# Patient Record
(demographics unavailable — no encounter records)

---

## 2024-10-22 NOTE — PHYSICAL EXAM
[Alert] : alert [No Acute Distress] : no acute distress [Normocephalic] : normocephalic [EOMI Bilateral] : EOMI bilateral [PERRLA] : ARON [Clear tympanic membranes with bony landmarks and light reflex present bilaterally] : clear tympanic membranes with bony landmarks and light reflex present bilaterally  [Pink Nasal Mucosa] : pink nasal mucosa [Nonerythematous Oropharynx] : nonerythematous oropharynx [Supple, full passive range of motion] : supple, full passive range of motion [No Palpable Masses] : no palpable masses [Clear to Auscultation Bilaterally] : clear to auscultation bilaterally [Regular Rate and Rhythm] : regular rate and rhythm [Normal S1, S2 audible] : normal S1, S2 audible [No Murmurs] : no murmurs [Soft] : soft [NonTender] : non tender [Non Distended] : non distended [Normoactive Bowel Sounds] : normoactive bowel sounds [Ricardo: _____] : Ricardo [unfilled] [Uncircumcised] : uncircumcised [Bilateral descended testes] : bilateral descended testes [No Testicular Masses] : no testicular masses [Normal Muscle Tone] : normal muscle tone [No pain or deformities with palpation of bone, muscles, joints] : no pain or deformities with palpation of bone, muscles, joints [Moves all extremities x 4] : moves all extremities x4 [Cranial Nerves Grossly Intact] : cranial nerves grossly intact [FreeTextEntry1] : pleasant [FreeTextEntry4] : nasal congestion [FreeTextEntry6] : no inguinal hernias  [de-identified] : mild spinal asymmetry of thoracic spine [de-identified] : normal strength in all extremities  [de-identified] : irregular mild hypopigmentation on L forearm

## 2024-10-22 NOTE — PHYSICAL EXAM
[Alert] : alert [No Acute Distress] : no acute distress [Normocephalic] : normocephalic [EOMI Bilateral] : EOMI bilateral [PERRLA] : ARON [Clear tympanic membranes with bony landmarks and light reflex present bilaterally] : clear tympanic membranes with bony landmarks and light reflex present bilaterally  [Pink Nasal Mucosa] : pink nasal mucosa [Nonerythematous Oropharynx] : nonerythematous oropharynx [Supple, full passive range of motion] : supple, full passive range of motion [No Palpable Masses] : no palpable masses [Clear to Auscultation Bilaterally] : clear to auscultation bilaterally [Regular Rate and Rhythm] : regular rate and rhythm [Normal S1, S2 audible] : normal S1, S2 audible [No Murmurs] : no murmurs [Soft] : soft [NonTender] : non tender [Non Distended] : non distended [Normoactive Bowel Sounds] : normoactive bowel sounds [Ricardo: _____] : Ricardo [unfilled] [Uncircumcised] : uncircumcised [Bilateral descended testes] : bilateral descended testes [No Testicular Masses] : no testicular masses [Normal Muscle Tone] : normal muscle tone [No pain or deformities with palpation of bone, muscles, joints] : no pain or deformities with palpation of bone, muscles, joints [Moves all extremities x 4] : moves all extremities x4 [Cranial Nerves Grossly Intact] : cranial nerves grossly intact [FreeTextEntry1] : pleasant [FreeTextEntry4] : nasal congestion [FreeTextEntry6] : no inguinal hernias  [de-identified] : mild spinal asymmetry of thoracic spine [de-identified] : normal strength in all extremities  [de-identified] : irregular mild hypopigmentation on L forearm

## 2024-10-22 NOTE — HISTORY OF PRESENT ILLNESS
[Mother] : mother [Yes] : Patient goes to dentist yearly [Toothpaste] : Primary Fluoride Source: Toothpaste [Up to date] : Up to date [Needs Immunizations] : Needs immunizations [FreeTextEntry7] : UC visit last week for L hand ring finger injury while playing football, no concern for fracture; swelling has improved, pain has resolved, no meds taken this week [de-identified] : MenACWY #2 & Flu  [FreeTextEntry1] :  Diet consists of meat and carbs, minimal fruits and vegetables, no dairy No sugary drinks  Reports lip/tongue itching whenever he eats apples, no other sx of allergic reaction  Normal elimination  Adequate sleep (at least 8 hours), denies problems  In 11th grade Grades are above average (with exception of math, which he barely passed last year) Receives extra help and private tutoring for math   Plays two-hand touch football and basketball with friends 5 days/week Rides bike (without helmet) regularly Involved in afterschool clubs  Lives with parents and two siblings (ages 13 and 21) No smoke exposure Has chet 's license, allowed to drive until 9 pm  Ortho F/U appt in Nov 2023: x-ray noted < 10 degree curvature, dx with spinal asymmetry, recommend observation, no F/U needed  Allergy initial appt in March 2024: SPT+ mold, trees, weeds, grasses, recommended allergy meds and flonase, dx with oral allergy syndrome (no food allergy testing done)  Of note, previously evaluated by Cardio in 2020 for abnormal EKG (during ER for chest pain after direct injury to chest while jumping on trampoline, normal CXR but abnormal EKG) EKG NSR; Echo normal size and function, PFO with left to right shunt (normal variant) No Cardio F/U needed  FH: maternal grandmother sudden death at 34 years of age while dancing, father high cholesterol   Confidential hx: Feels safe at home and at school Denies bullying or peer pressure Denies alcohol/tobacco/substance use Denies sexual activity or sexual abuse Denies depression, anxiety, SI or HI PHQ-9 score 0, WILLIAM-7 score 0, CRAFFT screen negative

## 2024-10-22 NOTE — RISK ASSESSMENT
[Little interest or pleasure doing things] : 1) Little interest or pleasure doing things [Feeling down, depressed, or hopeless] : 2) Feeling down, depressed, or hopeless [0] : 2) Feeling down, depressed, or hopeless: Not at all (0) [PHQ-2 Negative - No further assessment needed] : PHQ-2 Negative - No further assessment needed [Has anyone in your immediate family (parents, grandparents, siblings) or other more distant relatives (aunts, uncles, cousins)  of heart] : Has anyone in your immediate family (parents, grandparents, siblings) or other more distant relatives (aunts, uncles, cousins)  of heart problems or had an unexpected sudden death before age 50 (This would include unexpected drownings, unexplained car accidents in which the relative was driving or sudden infant death syndrome.)? Yes [No Increased risk of SCA or SCD] : No Increased risk of SCA or SCD    [[id number]] : 0 [Have you ever fainted, passed out or had an unexplained seizure suddenly and without warning, especially during exercise or in response] : Have you ever fainted, passed out or had an unexplained seizure suddenly and without warning, especially during exercise or in response to sudden loud noises such as doorbells, alarm clocks and ringing telephones? No [Have you ever had exercise-related chest pain or shortness of breath?] : Have you ever had exercise-related chest pain or shortness of breath? No [Are you related to anyone with hypertrophic cardiomyopathy or hypertrophic obstructive cardiomyopathy, Marfan syndrome, arrhythmogenic] : Are you related to anyone with hypertrophic cardiomyopathy or hypertrophic obstructive cardiomyopathy, Marfan syndrome, arrhythmogenic right ventricular cardiomyopathy, long QT syndrome, short QT syndrome, Brugada syndrome or catecholaminergic polymorphic ventricular tachycardia, or anyone younger than 50 years with a pacemaker or implantable defibrillator? No

## 2024-10-22 NOTE — DISCUSSION/SUMMARY
[Normal Growth] : growth [Normal Development] : development  [No Elimination Concerns] : elimination [Normal Sleep Pattern] : sleep [Anticipatory Guidance Given] : Anticipatory guidance addressed as per the history of present illness section [Influenza] : influenza [MCV] : meningococcal conjugate vaccine [No Medication Changes] : no medication changes [Patient] : patient [Mother] : mother [Full Activity without restrictions including Physical Education & Athletics] : Full Activity without restrictions including Physical Education & Athletics [I have examined the above-named student and completed the preparticipation physical evaluation. The athlete does not present apparent clinical contraindications to practice and participate in sport(s) as outlined above. A copy of the physical exam is on r] : I have examined the above-named student and completed the preparticipation physical evaluation. The athlete does not present apparent clinical contraindications to practice and participate in sport(s) as outlined above. A copy of the physical exam is on record in my office and can be made available to the school at the request of the parents. If conditions arise after the athlete has been cleared for participation, the physician may rescind the clearance until the problem is resolved and the potential consequences are completely explained to the athlete (and parents/guardians). [] : The components of the vaccine(s) to be administered today are listed in the plan of care. The disease(s) for which the vaccine(s) are intended to prevent and the risks have been discussed with the caretaker.  The risks are also included in the appropriate vaccination information statements which have been provided to the patient's caregiver.  The caregiver has given consent to vaccinate. [FreeTextEntry1] :  Healthy 16 year old male with allergic rhinitis (recent SPT + mold, trees, weeds, grasses) and oral allergy syndrome (oral itching with apple consumption) Cardio eval 4 years ago for abnormal EKG during ER visit for musculoskeletal chest pain... EKG in Cardio office with voltage criteria for LVH but normal echo, no F/U needed; of note, FH of premature sudden death (maternal grandmother) No cardiac sx during exercise or otherwise Hx of elevated LDL cholesterol likely due to diet and FH; nearly normal testing last year Growth spurt in the last year (corresponding with pubertal stage); BMI in healthy range Exam notable for mild spinal asymmetry (evaluated by Ortho, no F/U needed) and irregular mild hypopigmentation on L forearm only (possibly due to dry skin, no concern for vitiligo) No concerns on HEADSS assessment  Repeat BP w/ large adult cuff 123/79   1) Health maintenance - Extensive dietary counseling provided - Recommend monitoring BP - Routine F/U with dentist - Received MenACWY #2 & Flu vaccines - Routine CBC and lipid testing  2) Allergies - Take OTC claritin, flonase, and zaditor PRN - No concern for food allergy

## 2024-10-22 NOTE — REVIEW OF SYSTEMS
[Snoring] : snoring [Negative] : Genitourinary [Nasal Congestion] : nasal congestion [Difficulty with Sleep] : no difficulty with sleep [Chest Pain] : no chest pain [Intolerance to Exercise] : tolerance to exercise [Back Pain] : no back pain [Restriction of Motion] : no restriction of motion [Bone Deformity] : no bone deformity [Swelling of Joint] : no swelling of joint [Erythema of Joint] : no erythema of joint [Rash] : no rash

## 2024-10-22 NOTE — RISK ASSESSMENT
[Little interest or pleasure doing things] : 1) Little interest or pleasure doing things [Feeling down, depressed, or hopeless] : 2) Feeling down, depressed, or hopeless [0] : 2) Feeling down, depressed, or hopeless: Not at all (0) [PHQ-2 Negative - No further assessment needed] : PHQ-2 Negative - No further assessment needed [Has anyone in your immediate family (parents, grandparents, siblings) or other more distant relatives (aunts, uncles, cousins)  of heart] : Has anyone in your immediate family (parents, grandparents, siblings) or other more distant relatives (aunts, uncles, cousins)  of heart problems or had an unexpected sudden death before age 50 (This would include unexpected drownings, unexplained car accidents in which the relative was driving or sudden infant death syndrome.)? Yes [No Increased risk of SCA or SCD] : No Increased risk of SCA or SCD    [[id number]] : 0 [Have you ever fainted, passed out or had an unexplained seizure suddenly and without warning, especially during exercise or in response] : Have you ever fainted, passed out or had an unexplained seizure suddenly and without warning, especially during exercise or in response to sudden loud noises such as doorbells, alarm clocks and ringing telephones? No [Have you ever had exercise-related chest pain or shortness of breath?] : Have you ever had exercise-related chest pain or shortness of breath? No [Are you related to anyone with hypertrophic cardiomyopathy or hypertrophic obstructive cardiomyopathy, Marfan syndrome, arrhythmogenic] : Are you related to anyone with hypertrophic cardiomyopathy or hypertrophic obstructive cardiomyopathy, Marfan syndrome, arrhythmogenic right ventricular cardiomyopathy, long QT syndrome, short QT syndrome, Brugada syndrome or catecholaminergic polymorphic ventricular tachycardia, or anyone younger than 50 years with a pacemaker or implantable defibrillator? No

## 2024-10-22 NOTE — RISK ASSESSMENT
[Little interest or pleasure doing things] : 1) Little interest or pleasure doing things [Feeling down, depressed, or hopeless] : 2) Feeling down, depressed, or hopeless [0] : 2) Feeling down, depressed, or hopeless: Not at all (0) [PHQ-2 Negative - No further assessment needed] : PHQ-2 Negative - No further assessment needed [Has anyone in your immediate family (parents, grandparents, siblings) or other more distant relatives (aunts, uncles, cousins)  of heart] : Has anyone in your immediate family (parents, grandparents, siblings) or other more distant relatives (aunts, uncles, cousins)  of heart problems or had an unexpected sudden death before age 50 (This would include unexpected drownings, unexplained car accidents in which the relative was driving or sudden infant death syndrome.)? Yes [No Increased risk of SCA or SCD] : No Increased risk of SCA or SCD    [[id number]] : 0 [Have you ever fainted, passed out or had an unexplained seizure suddenly and without warning, especially during exercise or in response] : Have you ever fainted, passed out or had an unexplained seizure suddenly and without warning, especially during exercise or in response to sudden loud noises such as doorbells, alarm clocks and ringing telephones? No [Have you ever had exercise-related chest pain or shortness of breath?] : Have you ever had exercise-related chest pain or shortness of breath? No [Are you related to anyone with hypertrophic cardiomyopathy or hypertrophic obstructive cardiomyopathy, Marfan syndrome, arrhythmogenic] : Are you related to anyone with hypertrophic cardiomyopathy or hypertrophic obstructive cardiomyopathy, Marfan syndrome, arrhythmogenic right ventricular cardiomyopathy, long QT syndrome, short QT syndrome, Brugada syndrome or catecholaminergic polymorphic ventricular tachycardia, or anyone younger than 50 years with a pacemaker or implantable defibrillator? No

## 2024-10-22 NOTE — PHYSICAL EXAM
[Alert] : alert [No Acute Distress] : no acute distress [Normocephalic] : normocephalic [EOMI Bilateral] : EOMI bilateral [PERRLA] : ARON [Clear tympanic membranes with bony landmarks and light reflex present bilaterally] : clear tympanic membranes with bony landmarks and light reflex present bilaterally  [Pink Nasal Mucosa] : pink nasal mucosa [Nonerythematous Oropharynx] : nonerythematous oropharynx [Supple, full passive range of motion] : supple, full passive range of motion [No Palpable Masses] : no palpable masses [Clear to Auscultation Bilaterally] : clear to auscultation bilaterally [Regular Rate and Rhythm] : regular rate and rhythm [Normal S1, S2 audible] : normal S1, S2 audible [No Murmurs] : no murmurs [Soft] : soft [NonTender] : non tender [Non Distended] : non distended [Normoactive Bowel Sounds] : normoactive bowel sounds [Ricardo: _____] : Ricardo [unfilled] [Uncircumcised] : uncircumcised [Bilateral descended testes] : bilateral descended testes [No Testicular Masses] : no testicular masses [Normal Muscle Tone] : normal muscle tone [No pain or deformities with palpation of bone, muscles, joints] : no pain or deformities with palpation of bone, muscles, joints [Moves all extremities x 4] : moves all extremities x4 [Cranial Nerves Grossly Intact] : cranial nerves grossly intact [FreeTextEntry1] : pleasant [FreeTextEntry4] : nasal congestion [FreeTextEntry6] : no inguinal hernias  [de-identified] : mild spinal asymmetry of thoracic spine [de-identified] : normal strength in all extremities  [de-identified] : irregular mild hypopigmentation on L forearm

## 2024-10-22 NOTE — HISTORY OF PRESENT ILLNESS
[Mother] : mother [Yes] : Patient goes to dentist yearly [Toothpaste] : Primary Fluoride Source: Toothpaste [Up to date] : Up to date [Needs Immunizations] : Needs immunizations [FreeTextEntry7] : UC visit last week for L hand ring finger injury while playing football, no concern for fracture; swelling has improved, pain has resolved, no meds taken this week [de-identified] : MenACWY #2 & Flu  [FreeTextEntry1] :  Diet consists of meat and carbs, minimal fruits and vegetables, no dairy No sugary drinks  Reports lip/tongue itching whenever he eats apples, no other sx of allergic reaction  Normal elimination  Adequate sleep (at least 8 hours), denies problems  In 11th grade Grades are above average (with exception of math, which he barely passed last year) Receives extra help and private tutoring for math   Plays two-hand touch football and basketball with friends 5 days/week Rides bike (without helmet) regularly Involved in afterschool clubs  Lives with parents and two siblings (ages 13 and 21) No smoke exposure Has chet 's license, allowed to drive until 9 pm  Ortho F/U appt in Nov 2023: x-ray noted < 10 degree curvature, dx with spinal asymmetry, recommend observation, no F/U needed  Allergy initial appt in March 2024: SPT+ mold, trees, weeds, grasses, recommended allergy meds and flonase, dx with oral allergy syndrome (no food allergy testing done)  Of note, previously evaluated by Cardio in 2020 for abnormal EKG (during ER for chest pain after direct injury to chest while jumping on trampoline, normal CXR but abnormal EKG) EKG NSR; Echo normal size and function, PFO with left to right shunt (normal variant) No Cardio F/U needed  FH: maternal grandmother sudden death at 34 years of age while dancing, father high cholesterol   Confidential hx: Feels safe at home and at school Denies bullying or peer pressure Denies alcohol/tobacco/substance use Denies sexual activity or sexual abuse Denies depression, anxiety, SI or HI PHQ-9 score 0, WILLIAM-7 score 0, CRAFFT screen negative

## 2024-10-22 NOTE — HISTORY OF PRESENT ILLNESS
[Mother] : mother [Yes] : Patient goes to dentist yearly [Toothpaste] : Primary Fluoride Source: Toothpaste [Up to date] : Up to date [Needs Immunizations] : Needs immunizations [FreeTextEntry7] : UC visit last week for L hand ring finger injury while playing football, no concern for fracture; swelling has improved, pain has resolved, no meds taken this week [de-identified] : MenACWY #2 & Flu  [FreeTextEntry1] :  Diet consists of meat and carbs, minimal fruits and vegetables, no dairy No sugary drinks  Reports lip/tongue itching whenever he eats apples, no other sx of allergic reaction  Normal elimination  Adequate sleep (at least 8 hours), denies problems  In 11th grade Grades are above average (with exception of math, which he barely passed last year) Receives extra help and private tutoring for math   Plays two-hand touch football and basketball with friends 5 days/week Rides bike (without helmet) regularly Involved in afterschool clubs  Lives with parents and two siblings (ages 13 and 21) No smoke exposure Has chet 's license, allowed to drive until 9 pm  Ortho F/U appt in Nov 2023: x-ray noted < 10 degree curvature, dx with spinal asymmetry, recommend observation, no F/U needed  Allergy initial appt in March 2024: SPT+ mold, trees, weeds, grasses, recommended allergy meds and flonase, dx with oral allergy syndrome (no food allergy testing done)  Of note, previously evaluated by Cardio in 2020 for abnormal EKG (during ER for chest pain after direct injury to chest while jumping on trampoline, normal CXR but abnormal EKG) EKG NSR; Echo normal size and function, PFO with left to right shunt (normal variant) No Cardio F/U needed  FH: maternal grandmother sudden death at 34 years of age while dancing, father high cholesterol   Confidential hx: Feels safe at home and at school Denies bullying or peer pressure Denies alcohol/tobacco/substance use Denies sexual activity or sexual abuse Denies depression, anxiety, SI or HI PHQ-9 score 0, WILLIAM-7 score 0, CRAFFT screen negative